# Patient Record
Sex: MALE | Race: WHITE
[De-identification: names, ages, dates, MRNs, and addresses within clinical notes are randomized per-mention and may not be internally consistent; named-entity substitution may affect disease eponyms.]

---

## 2018-08-10 ENCOUNTER — HOSPITAL ENCOUNTER (EMERGENCY)
Dept: HOSPITAL 62 - ER | Age: 18
Discharge: HOME | End: 2018-08-10
Payer: OTHER GOVERNMENT

## 2018-08-10 VITALS — DIASTOLIC BLOOD PRESSURE: 85 MMHG | SYSTOLIC BLOOD PRESSURE: 113 MMHG

## 2018-08-10 DIAGNOSIS — R11.2: Primary | ICD-10-CM

## 2018-08-10 PROCEDURE — 99283 EMERGENCY DEPT VISIT LOW MDM: CPT

## 2018-08-10 NOTE — ER DOCUMENT REPORT
ED General





- General


Chief Complaint: Nausea/Vomiting


Stated Complaint: VOMITING


Time Seen by Provider: 08/10/18 23:00


Notes: 





Patient presents with chief complaint of nausea vomiting that started last 

night he had several bouts this morning missing work.  He denies any chest pain 

shortness of breath cough congestion recent fevers or illnesses and denies any 

abdominal pain dysuria or testicular pain.  Patient has no known medical 

problems.  Patient states he has been waiting for 4-5 hours in the emergency 

department he is not vomiting and is asymptomatic at this time.





- Related Data


Allergies/Adverse Reactions: 


 





No Known Allergies Allergy (Verified 08/10/18 22:55)


 











Past Medical History





- Social History


Smoking Status: Never Smoker


Frequency of alcohol use: None


Drug Abuse: None


Family History: Reviewed & Not Pertinent


Patient has suicidal ideation: No


Patient has homicidal ideation: No


Renal/ Medical History: Denies: Hx Peritoneal Dialysis





Review of Systems





- Review of Systems


Constitutional: No symptoms reported


EENT: No symptoms reported


Cardiovascular: No symptoms reported


Respiratory: No symptoms reported


Gastrointestinal: Nausea, Vomiting


Genitourinary: No symptoms reported


Male Genitourinary: No symptoms reported


Musculoskeletal: No symptoms reported


Skin: No symptoms reported


Hematologic/Lymphatic: No symptoms reported


Neurological/Psychological: No symptoms reported





Physical Exam





- Vital signs


Vitals: 





 











Temp Pulse Resp BP Pulse Ox


 


 98.5 F   80   18   113/85   99 


 


 08/10/18 20:32  08/10/18 20:32  08/10/18 20:32  08/10/18 20:32  08/10/18 20:32














- General


General appearance: Appears well, Alert





- HEENT


Head: Normocephalic, Atraumatic





- Respiratory


Respiratory status: No respiratory distress


Chest status: Nontender





- Cardiovascular


Rhythm: Regular


Heart sounds: Normal auscultation


Murmur: No





- Abdominal


Inspection: Normal


Distension: No distension


Bowel sounds: Normal





- Neurological


Neuro grossly intact: Yes


Cognition: Normal


Orientation: AAOx4





Course





- Re-evaluation


Re-evalutation: 





08/10/18 23:14


Patient well-appearing in no acute distress.  Will be discharged with Zofran 

return precautions provided





- Vital Signs


Vital signs: 





 











Temp Pulse Resp BP Pulse Ox


 


 98.5 F   80   18   113/85   99 


 


 08/10/18 20:32  08/10/18 20:32  08/10/18 20:32  08/10/18 20:32  08/10/18 20:32














Discharge





- Discharge


Clinical Impression: 


Nausea & vomiting


Qualifiers:


 Vomiting type: unspecified Vomiting Intractability: non-intractable Qualified 

Code(s): R11.2 - Nausea with vomiting, unspecified





Disposition: HOME, SELF-CARE


Instructions:  Antinausea Medication ([facility]), Vomiting ([facility])


Prescriptions: 


Ondansetron [Zofran Odt 4 mg Tablet] 1 tab PO ASDIR PRN #15 tab.rapdis


 PRN Reason: For Nausea/Vomiting


Forms:  Return to Work